# Patient Record
Sex: FEMALE | Race: OTHER | HISPANIC OR LATINO
[De-identification: names, ages, dates, MRNs, and addresses within clinical notes are randomized per-mention and may not be internally consistent; named-entity substitution may affect disease eponyms.]

---

## 2022-01-07 PROBLEM — Z00.00 ENCOUNTER FOR PREVENTIVE HEALTH EXAMINATION: Status: ACTIVE | Noted: 2022-01-07

## 2022-01-21 ENCOUNTER — APPOINTMENT (OUTPATIENT)
Dept: ORTHOPEDIC SURGERY | Facility: CLINIC | Age: 47
End: 2022-01-21
Payer: COMMERCIAL

## 2022-01-21 VITALS
DIASTOLIC BLOOD PRESSURE: 80 MMHG | HEIGHT: 62 IN | BODY MASS INDEX: 27.79 KG/M2 | HEART RATE: 68 BPM | SYSTOLIC BLOOD PRESSURE: 120 MMHG | OXYGEN SATURATION: 98 % | WEIGHT: 151 LBS | TEMPERATURE: 98 F

## 2022-01-21 DIAGNOSIS — Z78.9 OTHER SPECIFIED HEALTH STATUS: ICD-10-CM

## 2022-01-21 DIAGNOSIS — Z82.62 FAMILY HISTORY OF OSTEOPOROSIS: ICD-10-CM

## 2022-01-21 DIAGNOSIS — Z82.61 FAMILY HISTORY OF ARTHRITIS: ICD-10-CM

## 2022-01-21 DIAGNOSIS — Z80.0 FAMILY HISTORY OF MALIGNANT NEOPLASM OF DIGESTIVE ORGANS: ICD-10-CM

## 2022-01-21 PROCEDURE — 99204 OFFICE O/P NEW MOD 45 MIN: CPT

## 2022-01-21 PROCEDURE — 72050 X-RAY EXAM NECK SPINE 4/5VWS: CPT

## 2022-01-21 RX ORDER — ASCORBIC ACID 500 MG
TABLET ORAL
Refills: 0 | Status: ACTIVE | COMMUNITY

## 2022-01-21 RX ORDER — AMOXICILLIN 500 MG
CAPSULE ORAL
Refills: 0 | Status: ACTIVE | COMMUNITY

## 2022-01-21 RX ORDER — CALCIUM CARBONATE/VITAMIN D3 600 MG-10
TABLET ORAL
Refills: 0 | Status: ACTIVE | COMMUNITY

## 2022-01-31 ENCOUNTER — APPOINTMENT (OUTPATIENT)
Dept: ORTHOPEDIC SURGERY | Facility: CLINIC | Age: 47
End: 2022-01-31
Payer: COMMERCIAL

## 2022-01-31 VITALS — HEIGHT: 62 IN | WEIGHT: 151 LBS | BODY MASS INDEX: 27.79 KG/M2 | RESPIRATION RATE: 16 BRPM

## 2022-01-31 PROCEDURE — 20611 DRAIN/INJ JOINT/BURSA W/US: CPT | Mod: LT

## 2022-01-31 RX ORDER — MELOXICAM 15 MG/1
15 TABLET ORAL DAILY
Qty: 1 | Refills: 1 | Status: ACTIVE | COMMUNITY
Start: 2022-01-31 | End: 1900-01-01

## 2023-01-31 ENCOUNTER — APPOINTMENT (OUTPATIENT)
Dept: GYNECOLOGIC ONCOLOGY | Facility: CLINIC | Age: 48
End: 2023-01-31
Payer: COMMERCIAL

## 2023-01-31 VITALS
BODY MASS INDEX: 27.79 KG/M2 | SYSTOLIC BLOOD PRESSURE: 130 MMHG | HEIGHT: 62 IN | WEIGHT: 151 LBS | DIASTOLIC BLOOD PRESSURE: 90 MMHG

## 2023-01-31 DIAGNOSIS — N83.291 OTHER OVARIAN CYST, RIGHT SIDE: ICD-10-CM

## 2023-01-31 PROCEDURE — 99204 OFFICE O/P NEW MOD 45 MIN: CPT

## 2023-01-31 NOTE — ASSESSMENT
[FreeTextEntry1] : 47 YEAR OLD FEMALE  (C/S x 2, spont ab x1 ) now presents with elevated CA-125 =139 2022.\par The patient is concerned about potential malignancy and is requesting further management.\par She also reports a history of abnormal uterine bleeding and pelvic pain.

## 2023-01-31 NOTE — OB HISTORY
[Total Preg ___] : : [unfilled] [Living ___] : [unfilled] (living) [ ___] : [unfilled]  section delivery(s) [AB Spont ___] : [unfilled] miscarriage(s)

## 2023-02-09 ENCOUNTER — OUTPATIENT (OUTPATIENT)
Dept: OUTPATIENT SERVICES | Facility: HOSPITAL | Age: 48
LOS: 1 days | End: 2023-02-09
Payer: COMMERCIAL

## 2023-02-09 VITALS
WEIGHT: 154.32 LBS | SYSTOLIC BLOOD PRESSURE: 130 MMHG | HEIGHT: 62 IN | TEMPERATURE: 98 F | DIASTOLIC BLOOD PRESSURE: 79 MMHG | OXYGEN SATURATION: 99 % | RESPIRATION RATE: 18 BRPM | HEART RATE: 84 BPM

## 2023-02-09 DIAGNOSIS — Z98.891 HISTORY OF UTERINE SCAR FROM PREVIOUS SURGERY: Chronic | ICD-10-CM

## 2023-02-09 DIAGNOSIS — Z01.818 ENCOUNTER FOR OTHER PREPROCEDURAL EXAMINATION: ICD-10-CM

## 2023-02-09 DIAGNOSIS — Z90.49 ACQUIRED ABSENCE OF OTHER SPECIFIED PARTS OF DIGESTIVE TRACT: Chronic | ICD-10-CM

## 2023-02-09 LAB
ALBUMIN SERPL ELPH-MCNC: 4.4 G/DL — SIGNIFICANT CHANGE UP (ref 3.5–5.2)
ALP SERPL-CCNC: 64 U/L — SIGNIFICANT CHANGE UP (ref 30–115)
ALT FLD-CCNC: 15 U/L — SIGNIFICANT CHANGE UP (ref 0–41)
ANION GAP SERPL CALC-SCNC: 12 MMOL/L — SIGNIFICANT CHANGE UP (ref 7–14)
APTT BLD: 31.7 SEC — SIGNIFICANT CHANGE UP (ref 27–39.2)
AST SERPL-CCNC: 21 U/L — SIGNIFICANT CHANGE UP (ref 0–41)
BASOPHILS # BLD AUTO: 0.03 K/UL — SIGNIFICANT CHANGE UP (ref 0–0.2)
BASOPHILS NFR BLD AUTO: 0.5 % — SIGNIFICANT CHANGE UP (ref 0–1)
BILIRUB SERPL-MCNC: <0.2 MG/DL — SIGNIFICANT CHANGE UP (ref 0.2–1.2)
BUN SERPL-MCNC: 18 MG/DL — SIGNIFICANT CHANGE UP (ref 10–20)
CALCIUM SERPL-MCNC: 9.3 MG/DL — SIGNIFICANT CHANGE UP (ref 8.4–10.5)
CHLORIDE SERPL-SCNC: 101 MMOL/L — SIGNIFICANT CHANGE UP (ref 98–110)
CO2 SERPL-SCNC: 27 MMOL/L — SIGNIFICANT CHANGE UP (ref 17–32)
CREAT SERPL-MCNC: 0.9 MG/DL — SIGNIFICANT CHANGE UP (ref 0.7–1.5)
EGFR: 78 ML/MIN/1.73M2 — SIGNIFICANT CHANGE UP
EOSINOPHIL # BLD AUTO: 0.21 K/UL — SIGNIFICANT CHANGE UP (ref 0–0.7)
EOSINOPHIL NFR BLD AUTO: 3.7 % — SIGNIFICANT CHANGE UP (ref 0–8)
GLUCOSE SERPL-MCNC: 82 MG/DL — SIGNIFICANT CHANGE UP (ref 70–99)
HCT VFR BLD CALC: 38 % — SIGNIFICANT CHANGE UP (ref 37–47)
HGB BLD-MCNC: 12.3 G/DL — SIGNIFICANT CHANGE UP (ref 12–16)
IMM GRANULOCYTES NFR BLD AUTO: 0.2 % — SIGNIFICANT CHANGE UP (ref 0.1–0.3)
INR BLD: 1 RATIO — SIGNIFICANT CHANGE UP (ref 0.65–1.3)
LYMPHOCYTES # BLD AUTO: 1.81 K/UL — SIGNIFICANT CHANGE UP (ref 1.2–3.4)
LYMPHOCYTES # BLD AUTO: 31.7 % — SIGNIFICANT CHANGE UP (ref 20.5–51.1)
MCHC RBC-ENTMCNC: 31.9 PG — HIGH (ref 27–31)
MCHC RBC-ENTMCNC: 32.4 G/DL — SIGNIFICANT CHANGE UP (ref 32–37)
MCV RBC AUTO: 98.7 FL — SIGNIFICANT CHANGE UP (ref 81–99)
MONOCYTES # BLD AUTO: 0.5 K/UL — SIGNIFICANT CHANGE UP (ref 0.1–0.6)
MONOCYTES NFR BLD AUTO: 8.8 % — SIGNIFICANT CHANGE UP (ref 1.7–9.3)
NEUTROPHILS # BLD AUTO: 3.15 K/UL — SIGNIFICANT CHANGE UP (ref 1.4–6.5)
NEUTROPHILS NFR BLD AUTO: 55.1 % — SIGNIFICANT CHANGE UP (ref 42.2–75.2)
NRBC # BLD: 0 /100 WBCS — SIGNIFICANT CHANGE UP (ref 0–0)
PLATELET # BLD AUTO: 295 K/UL — SIGNIFICANT CHANGE UP (ref 130–400)
POTASSIUM SERPL-MCNC: 4.1 MMOL/L — SIGNIFICANT CHANGE UP (ref 3.5–5)
POTASSIUM SERPL-SCNC: 4.1 MMOL/L — SIGNIFICANT CHANGE UP (ref 3.5–5)
PROT SERPL-MCNC: 7.2 G/DL — SIGNIFICANT CHANGE UP (ref 6–8)
PROTHROM AB SERPL-ACNC: 11.4 SEC — SIGNIFICANT CHANGE UP (ref 9.95–12.87)
RBC # BLD: 3.85 M/UL — LOW (ref 4.2–5.4)
RBC # FLD: 13.4 % — SIGNIFICANT CHANGE UP (ref 11.5–14.5)
SODIUM SERPL-SCNC: 140 MMOL/L — SIGNIFICANT CHANGE UP (ref 135–146)
WBC # BLD: 5.71 K/UL — SIGNIFICANT CHANGE UP (ref 4.8–10.8)
WBC # FLD AUTO: 5.71 K/UL — SIGNIFICANT CHANGE UP (ref 4.8–10.8)

## 2023-02-09 PROCEDURE — 85025 COMPLETE CBC W/AUTO DIFF WBC: CPT

## 2023-02-09 PROCEDURE — 85730 THROMBOPLASTIN TIME PARTIAL: CPT

## 2023-02-09 PROCEDURE — 93005 ELECTROCARDIOGRAM TRACING: CPT

## 2023-02-09 PROCEDURE — 36415 COLL VENOUS BLD VENIPUNCTURE: CPT

## 2023-02-09 PROCEDURE — 99214 OFFICE O/P EST MOD 30 MIN: CPT | Mod: 25

## 2023-02-09 PROCEDURE — 93010 ELECTROCARDIOGRAM REPORT: CPT

## 2023-02-09 PROCEDURE — 85610 PROTHROMBIN TIME: CPT

## 2023-02-09 PROCEDURE — 80053 COMPREHEN METABOLIC PANEL: CPT

## 2023-02-09 NOTE — H&P PST ADULT - ATTENDING COMMENTS
47 YEAR OLD FEMALE  (C/S x 2, spont ab x1 ) now presents with elevated CA-125 =139 2022.  The patient is concerned about potential malignancy and is requesting further management.  She also reports a history of abnormal uterine bleeding and pelvic pain.     Assessment  History of  Section  History of Appendectomy  Pelvic pain in female (625.9) (R10.2)  Abnormal uterine bleeding (626.9) (N93.9)  Elevated CA-125 (795.82) (R97.1)  Other ovarian cyst, right side (620.2) (N83.291)    Plan  Abnl uterine bleeding , Pelvic Pain , Ovarian cyst, elevated CA-125  Options for Observation, versus EUA D&C hysteroscopy, diagnostic laparoscopy versus TLH/BS .   RB&A reviewed and the patient request further diagnostic interventions.  She opted for a D&C hysteroscopy along with a diagnostic laparoscopy possible ovarian cystectomies.

## 2023-02-09 NOTE — H&P PST ADULT - HISTORY OF PRESENT ILLNESS
Pt denies cp palp uri cough dysuria or sob.   ET: 1 FOS- denies SOB .   et 2 flat blocks denies SOB    PT denies any open wounds, drainage or rashes. aware to self quaerantine preop. all instructions reviewed.  scheduled for 2/23 exam under anesthesia D&C hysteroscopy laparosxopy. pt has irregular heavy menstrual bleeding. last menses 12/9/23 but has brown discharge daily since  denies any current abdominal pain. pain 0/10-denies   patient had episode of abdominal pain/cramping in 11/2022 with pain scale 10/10 at time. history of rickey at time for pain-denies any current pain meds.  As per patient, this is their complete medical and surgical history, including medications both prescribed or over the counter.  Patient verbalized understanding of instructions and was given the opportunity to ask questions and have them answered.  Patient denies any signs or symptoms of COVID 19 and denies contact with known positive individuals.  They have an appointment for repeat COVID testing pre-procedure and acknowledge its time and place.  They were instructed to quarantine pre-procedure, practice exposure control measures, continue to self-monitor and report any concerns to their proceduralist.  Anesthesia Alert  NO--Difficult Airway  I  NO--History of neck surgery or radiation  NO--Limited ROM of neck  NO--History of Malignant hyperthermia  NO--Personal or family history of Pseudocholinesterase deficiency.  NO--Prior Anesthesia Complication  NO--Latex Allergy  NO--Loose teeth  NO--History of Rheumatoid Arthritis  UNKNOWN BHARAT - ADMITS TO SNORING occasional  -DENIES W/U   NO--Bleeding risk  NO--Other_____  .

## 2023-02-09 NOTE — H&P PST ADULT - NSANTHOSAYNRD_GEN_A_CORE
DENIES BHARAT-ADMITS SNORING OCASIONAL/No. BHARAT screening performed.  STOP BANG Legend: 0-2 = LOW Risk; 3-4 = INTERMEDIATE Risk; 5-8 = HIGH Risk

## 2023-02-09 NOTE — H&P PST ADULT - INTERPRETATION SERVICES DECLINED
I have reviewed discharge instructions with the parent. The caregiver verbalized understanding. Discharge instructions signed and placed in chart. "I don't want a -I understand"

## 2023-02-10 DIAGNOSIS — Z01.818 ENCOUNTER FOR OTHER PREPROCEDURAL EXAMINATION: ICD-10-CM

## 2023-02-23 ENCOUNTER — OUTPATIENT (OUTPATIENT)
Dept: INPATIENT UNIT | Facility: HOSPITAL | Age: 48
LOS: 1 days | Discharge: HOME | End: 2023-02-23
Payer: COMMERCIAL

## 2023-02-23 ENCOUNTER — RESULT REVIEW (OUTPATIENT)
Age: 48
End: 2023-02-23

## 2023-02-23 ENCOUNTER — TRANSCRIPTION ENCOUNTER (OUTPATIENT)
Age: 48
End: 2023-02-23

## 2023-02-23 ENCOUNTER — APPOINTMENT (OUTPATIENT)
Dept: GYNECOLOGIC ONCOLOGY | Facility: HOSPITAL | Age: 48
End: 2023-02-23
Payer: COMMERCIAL

## 2023-02-23 VITALS
SYSTOLIC BLOOD PRESSURE: 120 MMHG | RESPIRATION RATE: 15 BRPM | DIASTOLIC BLOOD PRESSURE: 78 MMHG | HEART RATE: 100 BPM | OXYGEN SATURATION: 98 %

## 2023-02-23 VITALS
RESPIRATION RATE: 18 BRPM | OXYGEN SATURATION: 100 % | DIASTOLIC BLOOD PRESSURE: 77 MMHG | SYSTOLIC BLOOD PRESSURE: 127 MMHG | HEART RATE: 89 BPM | WEIGHT: 149.91 LBS | HEIGHT: 62 IN | TEMPERATURE: 99 F

## 2023-02-23 DIAGNOSIS — Z90.49 ACQUIRED ABSENCE OF OTHER SPECIFIED PARTS OF DIGESTIVE TRACT: Chronic | ICD-10-CM

## 2023-02-23 DIAGNOSIS — Z98.891 HISTORY OF UTERINE SCAR FROM PREVIOUS SURGERY: Chronic | ICD-10-CM

## 2023-02-23 DIAGNOSIS — Z01.818 ENCOUNTER FOR OTHER PREPROCEDURAL EXAMINATION: ICD-10-CM

## 2023-02-23 PROCEDURE — 88305 TISSUE EXAM BY PATHOLOGIST: CPT

## 2023-02-23 PROCEDURE — 58662 LAPAROSCOPY EXCISE LESIONS: CPT | Mod: 59

## 2023-02-23 PROCEDURE — 88302 TISSUE EXAM BY PATHOLOGIST: CPT | Mod: 26

## 2023-02-23 PROCEDURE — 88305 TISSUE EXAM BY PATHOLOGIST: CPT | Mod: 26

## 2023-02-23 PROCEDURE — 88302 TISSUE EXAM BY PATHOLOGIST: CPT

## 2023-02-23 PROCEDURE — 58558 HYSTEROSCOPY BIOPSY: CPT

## 2023-02-23 RX ORDER — SODIUM CHLORIDE 9 MG/ML
1000 INJECTION, SOLUTION INTRAVENOUS
Refills: 0 | Status: DISCONTINUED | OUTPATIENT
Start: 2023-02-23 | End: 2023-02-24

## 2023-02-23 RX ORDER — IBUPROFEN 200 MG
1 TABLET ORAL
Qty: 20 | Refills: 0
Start: 2023-02-23 | End: 2023-02-27

## 2023-02-23 RX ORDER — OXYCODONE HYDROCHLORIDE 5 MG/1
1 TABLET ORAL
Qty: 8 | Refills: 0
Start: 2023-02-23

## 2023-02-23 RX ORDER — IBUPROFEN 200 MG
600 TABLET ORAL EVERY 6 HOURS
Refills: 0 | Status: DISCONTINUED | OUTPATIENT
Start: 2023-02-23 | End: 2023-02-24

## 2023-02-23 RX ORDER — SIMETHICONE 80 MG/1
1 TABLET, CHEWABLE ORAL
Qty: 20 | Refills: 1
Start: 2023-02-23 | End: 2023-03-04

## 2023-02-23 RX ORDER — ONDANSETRON 8 MG/1
4 TABLET, FILM COATED ORAL ONCE
Refills: 0 | Status: DISCONTINUED | OUTPATIENT
Start: 2023-02-23 | End: 2023-02-24

## 2023-02-23 RX ORDER — HYDROMORPHONE HYDROCHLORIDE 2 MG/ML
1 INJECTION INTRAMUSCULAR; INTRAVENOUS; SUBCUTANEOUS
Refills: 0 | Status: DISCONTINUED | OUTPATIENT
Start: 2023-02-23 | End: 2023-02-24

## 2023-02-23 RX ORDER — HYDROMORPHONE HYDROCHLORIDE 2 MG/ML
0.5 INJECTION INTRAMUSCULAR; INTRAVENOUS; SUBCUTANEOUS
Refills: 0 | Status: DISCONTINUED | OUTPATIENT
Start: 2023-02-23 | End: 2023-02-24

## 2023-02-23 RX ORDER — ACETAMINOPHEN 500 MG
2 TABLET ORAL
Qty: 30 | Refills: 0
Start: 2023-02-23 | End: 2023-02-27

## 2023-02-23 RX ORDER — ACETAMINOPHEN 500 MG
650 TABLET ORAL EVERY 6 HOURS
Refills: 0 | Status: DISCONTINUED | OUTPATIENT
Start: 2023-02-23 | End: 2023-02-24

## 2023-02-23 RX ORDER — DOCUSATE SODIUM 100 MG
1 CAPSULE ORAL
Qty: 60 | Refills: 0
Start: 2023-02-23 | End: 2023-03-24

## 2023-02-23 RX ADMIN — Medication 600 MILLIGRAM(S): at 22:07

## 2023-02-23 RX ADMIN — Medication 650 MILLIGRAM(S): at 22:30

## 2023-02-23 RX ADMIN — HYDROMORPHONE HYDROCHLORIDE 1 MILLIGRAM(S): 2 INJECTION INTRAMUSCULAR; INTRAVENOUS; SUBCUTANEOUS at 20:15

## 2023-02-23 RX ADMIN — Medication 650 MILLIGRAM(S): at 22:08

## 2023-02-23 RX ADMIN — HYDROMORPHONE HYDROCHLORIDE 1 MILLIGRAM(S): 2 INJECTION INTRAMUSCULAR; INTRAVENOUS; SUBCUTANEOUS at 19:50

## 2023-02-23 RX ADMIN — Medication 600 MILLIGRAM(S): at 22:30

## 2023-02-23 NOTE — ASU DISCHARGE PLAN (ADULT/PEDIATRIC) - NS MD DC FALL RISK RISK
For information on Fall & Injury Prevention, visit: [email or web address] OR  [email or web address] OR  https://www.cdc.gov/steadi/patient.html

## 2023-02-23 NOTE — BRIEF OPERATIVE NOTE - OPERATION/FINDINGS
Small, anteverted uterus, no palpable masses.  On laparoscopic exam, extensive adhesions of omentum to anterior abdominal wall. Also with filmy adhesions of bowel to bilateral pelvic side walls. Bilateral ovaries with evidence of prior tubal ligation as well as hydrosalpinx and adhesive disease.  Right ovary normal-appearing.  Left ovary with multiple follicular-appearing cysts as well as endometrioma.  Liver edge and gallbladder normal-appearing.

## 2023-02-23 NOTE — CHART NOTE - NSCHARTNOTEFT_GEN_A_CORE
PACU ANESTHESIA ADMISSION NOTE      Procedure:   Post op diagnosis:      ____  Intubated  TV:______       Rate: ______      FiO2: ______    __x__  Patent Airway    _x___  Full return of protective reflexes    ___x_  Full recovery from anesthesia / back to baseline status    Vitals:  T(F): 99.1 (02-23-23 @ 19:48), Max: 37.2 (02-23-23 @ 15:20)  HR: 60 (02-23-23 @ 19:48) (89 - 89)  BP: 116/72 (02-23-23 @ 19:48) (127/77 - 127/77)  RR: 21 (02-23-23 @ 19:48) (18 - 18)  SpO2: 99% (02-23-23 @ 19:48) (100% - 100%)    Mental Status:  __x__ Awake   ____x_ Alert   _____ Drowsy   _____ Sedated    Nausea/Vomiting:  __x__ NO  ______Yes,   See Post - Op Orders          Pain Scale (0-10):  ___5__    Treatment: ____ None    ___x_ See Post - Op/PCA Orders    Post - Operative Fluids:   ____ Oral   __x__ See Post - Op Orders    Plan: Discharge:   __x__Home       _____Floor     _____Critical Care    _____  Other:_________________    Comments: Transferred care to PACU RN; discharge to home when criteria met.

## 2023-02-23 NOTE — BRIEF OPERATIVE NOTE - NSICDXBRIEFPREOP_GEN_ALL_CORE_FT
PRE-OP DIAGNOSIS:  Abnormal uterine bleeding 23-Feb-2023 20:05:50  Fang Wagner  Pain pelvic 23-Feb-2023 20:05:36  Fang Wagner  Elevated CA-125 23-Feb-2023 20:04:19  Fang Wagner

## 2023-02-23 NOTE — ASU DISCHARGE PLAN (ADULT/PEDIATRIC) - CARE PROVIDER_API CALL
Carlyn Ramos)  Gynecologic Oncology; Obstetrics and Gynecology  [address] [place] [address], [address] [place] [address]  Phone: [phone]  Fax: [phone]  Follow Up Time: 2 weeks

## 2023-02-23 NOTE — BRIEF OPERATIVE NOTE - NSICDXBRIEFPOSTOP_GEN_ALL_CORE_FT
POST-OP DIAGNOSIS:  Pain pelvic 23-Feb-2023 20:06:01  Bernard, Fang  Elevated CA-125 23-Feb-2023 20:06:06  Bernard, Fang  Abnormal uterine bleeding 23-Feb-2023 20:05:58  Bernard, Fang  Endometriosis 23-Feb-2023 20:06:18  Bernard, Fang  Hydrosalpinx 23-Feb-2023 20:07:26 bilateral Bernard, Fang

## 2023-02-23 NOTE — ASU DISCHARGE PLAN (ADULT/PEDIATRIC) - ASU DC SPECIAL INSTRUCTIONSFT
Nothing in the vagina for 4 weeks (no sex, no tampons, no douching). Avoid tub baths, you may shower.  If you have a fever of 101F or greater, severe vaginal bleeding, or severe abdominal pain, call your Gyn or come to the emergency department immediately.  Please follow up with your provider in 2 weeks for postop visit.

## 2023-02-23 NOTE — BRIEF OPERATIVE NOTE - NSICDXBRIEFPROCEDURE_GEN_ALL_CORE_FT
PROCEDURES:  Laparoscopy, diagnostic, with dilation and curettage of uterus 23-Feb-2023 20:03:23  Fang Wagner  Bilateral salpingectomy 23-Feb-2023 20:03:50  Fnag Wagner  Laparoscopic ovarian cystectomy 23-Feb-2023 20:04:03  Fang Wagner

## 2023-02-24 ENCOUNTER — NON-APPOINTMENT (OUTPATIENT)
Age: 48
End: 2023-02-24

## 2023-02-25 PROBLEM — Z78.9 OTHER SPECIFIED HEALTH STATUS: Chronic | Status: ACTIVE | Noted: 2023-02-09

## 2023-03-03 DIAGNOSIS — N93.8 OTHER SPECIFIED ABNORMAL UTERINE AND VAGINAL BLEEDING: ICD-10-CM

## 2023-03-03 DIAGNOSIS — N80.122 DEEP ENDOMETRIOSIS OF LEFT OVARY: ICD-10-CM

## 2023-03-03 DIAGNOSIS — N83.12 CORPUS LUTEUM CYST OF LEFT OVARY: ICD-10-CM

## 2023-03-03 DIAGNOSIS — N70.11 CHRONIC SALPINGITIS: ICD-10-CM

## 2023-03-10 ENCOUNTER — APPOINTMENT (OUTPATIENT)
Dept: GYNECOLOGIC ONCOLOGY | Facility: CLINIC | Age: 48
End: 2023-03-10
Payer: COMMERCIAL

## 2023-03-10 VITALS
BODY MASS INDEX: 27.79 KG/M2 | HEIGHT: 62 IN | SYSTOLIC BLOOD PRESSURE: 123 MMHG | DIASTOLIC BLOOD PRESSURE: 74 MMHG | WEIGHT: 151 LBS

## 2023-03-10 DIAGNOSIS — N93.9 ABNORMAL UTERINE AND VAGINAL BLEEDING, UNSPECIFIED: ICD-10-CM

## 2023-03-10 DIAGNOSIS — R10.2 PELVIC AND PERINEAL PAIN: ICD-10-CM

## 2023-03-10 DIAGNOSIS — Z92.89 PERSONAL HISTORY OF OTHER MEDICAL TREATMENT: ICD-10-CM

## 2023-03-10 DIAGNOSIS — M75.02 ADHESIVE CAPSULITIS OF LEFT SHOULDER: ICD-10-CM

## 2023-03-10 PROCEDURE — 99024 POSTOP FOLLOW-UP VISIT: CPT

## 2023-03-10 NOTE — DISCUSSION/SUMMARY
[Clean] : was clean [Dry] : was dry [Intact] : was intact [Erythema] : was not erythematous [Ecchymosis] : was not ecchymotic [Seroma] : had no seroma [None] : had no drainage [Normal Skin] : normal appearance [Firm] : soft [Tender] : nontender [Abnormal Bowel Sounds] : normal bowel sounds [Rebound] : no rebound tenderness [Guarding] : no guarding [Incisional Hernia] : no incisional hernia [Mass] : no palpable mass [Cervical Abnormality] : normal cervix [External Genitalia Abnormal] : normal external genitalia [Vaginal Exam Abnormal] : normal vaginal exam [Doing Well] : is doing well

## 2023-03-10 NOTE — ASSESSMENT
[FreeTextEntry1] : 48 yr old female,  (C/S x 2, spont ab x1) presented with elevated CA-125 =139 Cctober ; also reports Hx abnormal uterine bleeding and pelvic pain.   The patient was  concerned about potential malignancy and is requested further management.\par \par Last seen in GYN ONC office 23 in which surgical management was discussed. \par \par Patient now s/p lap D&C, BS, ovarian cystectomy on 23.\par \par Final pathology (23) \par Surgical Pathology Report - Auth (Verified)\par \par Specimen(s) Submitted\par 1  Endometrial curettings\par 2  Endocervical curettings\par 3  Left tube\par 4  Right tube\par 5  Left ovarian cyst\par \par Final Diagnosis\par \par 1. Endometrial curettings:\par - Fragments of benign proliferative type endometrium, with stromal breakdown,  and focal rare plasma cells seen in endometrial stroma (see Comment).\par - Fragments of benign cervical   squamous epithelium,  endocervical  glandular tissue and mucus seen in this material.\par \par Comment: 1.  Plasma cells seen in endometrial stroma are suggestive of chronic endometritis. Clinical correlation is suggested.\par \par 2. Endocervical curettings:\par - Small fragments of benign cervical   squamous epithelium,  endocervical glandular tissue and mucus.\par - Fragments of benign endometrium   seen in this material.\par \par 3. Left tube:\par - Portion of benign fallopian tube, with focal paratubal fibrous tissue seen.\par - Complete cross-sections identified.\par \par 4. Right tube:\par - Portion of benign fallopian tube, with paratubal cyst seen.\par - Complete cross-sections identified.\par \par 5. Left ovarian cyst:\par - Benign ovarian follicle cyst, hemorrhagic corpus luteum, and focal endometriosis seen.\par \par Verified by: Phill Singleton M.D.\par (Electronic Signature)\par Reported on: 23 20:45 EST, [facility],\par [address] [facility], [place] [address]\par Phone: [phone]   Fax: [phone]\par \par Presenting today for first post-op visit.

## 2023-03-10 NOTE — REASON FOR VISIT
[Post Op] : post op visit [de-identified] : 2-23-23 [de-identified] : Lap D&C, BS, ovarian cystectomy

## 2023-03-24 ENCOUNTER — NON-APPOINTMENT (OUTPATIENT)
Age: 48
End: 2023-03-24

## 2023-08-30 ENCOUNTER — NON-APPOINTMENT (OUTPATIENT)
Age: 48
End: 2023-08-30

## 2023-09-05 NOTE — HISTORY OF PRESENT ILLNESS
[FreeTextEntry1] : 48 yr old female,  (C/S x 2, spont ab x1) presented with elevated CA-125 =139 Cctober ; also reports Hx abnormal uterine bleeding and pelvic pain. The patient was concerned about potential malignancy and is requested further management.  Patient now s/p lap D&C, BS, ovarian cystectomy on 23 . Final pathology (23) Surgical Pathology Report - Auth (Verified)  Specimen(s) Submitted 1 Endometrial curettings 2 Endocervical curettings 3 Left tube 4 Right tube 5 Left ovarian cyst  Final Diagnosis 1. Endometrial curettings: - Fragments of benign proliferative type endometrium, with stromal breakdown, and focal rare plasma cells seen in endometrial stroma (see Comment). - Fragments of benign cervical squamous epithelium, endocervical glandular tissue and mucus seen in this material.  Comment: 1. Plasma cells seen in endometrial stroma are suggestive of chronic endometritis. Clinical correlation is suggested.  2. Endocervical curettings: - Small fragments of benign cervical squamous epithelium, endocervical glandular tissue and mucus. - Fragments of benign endometrium seen in this material.  3. Left tube: - Portion of benign fallopian tube, with focal paratubal fibrous tissue seen. - Complete cross-sections identified.  4. Right tube:  Portion of benign fallopian tube, with paratubal cyst seen. - Complete cross-sections identified.  5. Left ovarian cyst: - Benign ovarian follicle cyst, hemorrhagic corpus luteum, and focal endometriosis seen.  Verified by: Phill Singleton M.D. (Electronic Signature) Reported on: 23 20:45 [facility], [facility], [address] [place] [address] Phone: [phone] Fax: [phone]   Patient presents today for follow up.

## 2023-09-08 ENCOUNTER — APPOINTMENT (OUTPATIENT)
Dept: GYNECOLOGIC ONCOLOGY | Facility: CLINIC | Age: 48
End: 2023-09-08

## 2025-01-13 ENCOUNTER — APPOINTMENT (OUTPATIENT)
Dept: ORTHOPEDIC SURGERY | Facility: CLINIC | Age: 50
End: 2025-01-13
Payer: COMMERCIAL

## 2025-01-13 DIAGNOSIS — M67.432 GANGLION, LEFT WRIST: ICD-10-CM

## 2025-01-13 PROCEDURE — 20612 ASPIRATE/INJ GANGLION CYST: CPT

## 2025-01-13 PROCEDURE — 99203 OFFICE O/P NEW LOW 30 MIN: CPT | Mod: 25

## 2025-01-30 NOTE — ASU PATIENT PROFILE, ADULT - HEALTH/HEALTHCARE ANXIETIES, PROFILE
Detail Level: Zone Detail Level: Generalized Sunscreen Recommendations: SPF 50 or higher preferably zinc Detail Level: Detailed Detail Level: Simple Prescription Strength Graduated Compression Stockings Recommendations: The patient was counseled that prescription strength graduated compression stockings should be worn for all waking hours. They will follow up with a venous specialist to monitor graduated compression stocking usage and their symptoms. none

## 2025-09-08 ENCOUNTER — APPOINTMENT (OUTPATIENT)
Dept: ORTHOPEDIC SURGERY | Facility: CLINIC | Age: 50
End: 2025-09-08